# Patient Record
Sex: MALE | Race: WHITE
[De-identification: names, ages, dates, MRNs, and addresses within clinical notes are randomized per-mention and may not be internally consistent; named-entity substitution may affect disease eponyms.]

---

## 2020-01-05 ENCOUNTER — HOSPITAL ENCOUNTER (EMERGENCY)
Dept: HOSPITAL 97 - ER | Age: 25
Discharge: HOME | End: 2020-01-05
Payer: SELF-PAY

## 2020-01-05 VITALS — OXYGEN SATURATION: 100 %

## 2020-01-05 VITALS — DIASTOLIC BLOOD PRESSURE: 88 MMHG | SYSTOLIC BLOOD PRESSURE: 135 MMHG

## 2020-01-05 VITALS — TEMPERATURE: 97.6 F
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
## 2020-01-05 DIAGNOSIS — S01.111A: Primary | ICD-10-CM

## 2020-01-05 PROCEDURE — 0JQ10ZZ REPAIR FACE SUBCUTANEOUS TISSUE AND FASCIA, OPEN APPROACH: ICD-10-PCS

## 2020-01-05 PROCEDURE — 99284 EMERGENCY DEPT VISIT MOD MDM: CPT
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
## 2020-01-05 NOTE — RAD REPORT
EXAM DESCRIPTION:  RAD - Elbow Left 3 View - 1/5/2020 1:05 pm

 

CLINICAL HISTORY:  PAIN

Trauma, pain and injury

 

COMPARISON:  <Comparisons>

 

FINDINGS:  No acute fracture or dislocation.